# Patient Record
Sex: FEMALE | Race: WHITE | NOT HISPANIC OR LATINO | Employment: PART TIME | URBAN - METROPOLITAN AREA
[De-identification: names, ages, dates, MRNs, and addresses within clinical notes are randomized per-mention and may not be internally consistent; named-entity substitution may affect disease eponyms.]

---

## 2018-08-20 ENCOUNTER — HOSPITAL ENCOUNTER (EMERGENCY)
Facility: HOSPITAL | Age: 43
Discharge: HOME | End: 2018-08-20
Attending: EMERGENCY MEDICINE
Payer: MEDICAID

## 2018-08-20 VITALS
OXYGEN SATURATION: 100 % | WEIGHT: 124.88 LBS | DIASTOLIC BLOOD PRESSURE: 75 MMHG | HEIGHT: 63 IN | RESPIRATION RATE: 20 BRPM | BODY MASS INDEX: 22.12 KG/M2 | SYSTOLIC BLOOD PRESSURE: 156 MMHG | TEMPERATURE: 98 F | HEART RATE: 84 BPM

## 2018-08-20 DIAGNOSIS — R03.0 ELEVATED BLOOD PRESSURE READING WITHOUT DIAGNOSIS OF HYPERTENSION: ICD-10-CM

## 2018-08-20 DIAGNOSIS — K04.7 DENTAL ABSCESS: Primary | ICD-10-CM

## 2018-08-20 PROCEDURE — 99283 EMERGENCY DEPT VISIT LOW MDM: CPT

## 2018-08-20 RX ORDER — CLINDAMYCIN HYDROCHLORIDE 150 MG/1
300 CAPSULE ORAL 4 TIMES DAILY
Qty: 56 CAPSULE | Refills: 0 | Status: SHIPPED | OUTPATIENT
Start: 2018-08-20 | End: 2018-08-20 | Stop reason: SDUPTHER

## 2018-08-20 RX ORDER — CLINDAMYCIN HYDROCHLORIDE 150 MG/1
300 CAPSULE ORAL 4 TIMES DAILY
Qty: 56 CAPSULE | Refills: 0 | Status: SHIPPED | OUTPATIENT
Start: 2018-08-20 | End: 2018-08-27

## 2018-08-20 RX ORDER — BUPRENORPHINE HYDROCHLORIDE AND NALOXONE HYDROCHLORIDE DIHYDRATE 8; 2 MG/1; MG/1
1.5 TABLET SUBLINGUAL DAILY
COMMUNITY

## 2018-08-20 RX ORDER — NAPROXEN 500 MG/1
500 TABLET ORAL 2 TIMES DAILY WITH MEALS
Qty: 20 TABLET | Refills: 0 | Status: SHIPPED | OUTPATIENT
Start: 2018-08-20 | End: 2018-10-30

## 2018-08-20 RX ORDER — NAPROXEN 500 MG/1
500 TABLET ORAL 2 TIMES DAILY WITH MEALS
Qty: 20 TABLET | Refills: 0 | Status: SHIPPED | OUTPATIENT
Start: 2018-08-20 | End: 2018-08-20 | Stop reason: SDUPTHER

## 2018-08-20 NOTE — ED PROVIDER NOTES
Encounter Date: 8/20/2018       History     Chief Complaint   Patient presents with    Headache     Pt reports headache and earache since Saturday. BC powder 1-2 hour pta.     Otalgia     R ear.      The history is provided by the patient.   Headache    This is a new problem. The current episode started in the past 7 days. The problem occurs daily. The problem has been unchanged. The pain is located in the right unilateral and temporal region. The pain does not radiate. The pain quality is similar to prior headaches. The quality of the pain is described as aching. The pain is at a severity of 6/10. Associated symptoms include ear pain. Pertinent negatives include no abdominal pain, anorexia, back pain, blurred vision, coughing, dizziness, drainage, eye pain, eye redness, eye watering, fever, hearing loss, insomnia, loss of balance, muscle aches, nausea, neck pain, numbness, photophobia, seizures, sinus pressure, sore throat, swollen glands, tingling, tinnitus, visual change, vomiting, weakness or weight loss. The symptoms are aggravated by unknown. She has tried nothing for the symptoms.   Otalgia   This is a new problem. The current episode started several days ago. The problem occurs daily. The problem has been unchanged. The pain is at a severity of 7/10. Associated symptoms include headaches. Pertinent negatives include no hearing loss, no sore throat, no abdominal pain, no vomiting, no neck pain and no cough.        Review of patient's allergies indicates:  No Known Allergies  History reviewed. No pertinent past medical history.  Past Surgical History:   Procedure Laterality Date    BREAST SURGERY      TUBAL LIGATION       History reviewed. No pertinent family history.  Social History     Tobacco Use    Smoking status: Current Every Day Smoker     Packs/day: 1.00     Types: Cigarettes    Smokeless tobacco: Never Used   Substance Use Topics    Alcohol use: Yes     Comment: occasionally     Drug use: Yes      Review of Systems   Constitutional: Negative for fever and weight loss.   HENT: Positive for dental problem and ear pain. Negative for hearing loss, sinus pressure, sore throat and tinnitus.    Eyes: Negative for blurred vision, photophobia, pain and redness.   Respiratory: Negative for cough.    Gastrointestinal: Negative for abdominal pain, anorexia, nausea and vomiting.   Musculoskeletal: Negative for back pain and neck pain.   Neurological: Positive for headaches. Negative for dizziness, tingling, seizures, weakness, numbness and loss of balance.   Psychiatric/Behavioral: The patient does not have insomnia.    All other systems reviewed and are negative.      Physical Exam     Initial Vitals [08/20/18 1548]   BP Pulse Resp Temp SpO2   (!) 167/82 84 20 98 °F (36.7 °C) 100 %      MAP       --         Physical Exam    Nursing note and vitals reviewed.  Constitutional: She appears well-developed and well-nourished. No distress.   HENT:   Head: Normocephalic and atraumatic.   Right Ear: Hearing, tympanic membrane, external ear and ear canal normal. Tympanic membrane is not erythematous.   Left Ear: Hearing, tympanic membrane, external ear and ear canal normal. Tympanic membrane is not erythematous.   Nose: Nose normal.   Mouth/Throat: Oropharynx is clear and moist and mucous membranes are normal. Abnormal dentition. Dental abscesses and dental caries present.       Eyes: Conjunctivae and EOM are normal. Pupils are equal, round, and reactive to light.   Neck: Normal range of motion. Neck supple.   Cardiovascular: Normal rate and regular rhythm.   Pulmonary/Chest: Breath sounds normal. No respiratory distress. She has no wheezes. She has no rales.   Abdominal: Soft. Bowel sounds are normal.   Musculoskeletal: Normal range of motion.   Neurological: She is alert and oriented to person, place, and time. She has normal strength.   Skin: Skin is warm and dry.   Psychiatric: She has a normal mood and affect. Thought  content normal.         ED Course   Procedures  Labs Reviewed - No data to display       Imaging Results    None     4:18 PM - Counseling: Spoke with the patient and discussed todays findings, in addition to providing specific details for the plan of care and counseling regarding the diagnosis and prognosis. Questions are answered at this time. Patient's headache is either consistent with previous headache and/or lacks features concerning for emergent or life threatening condition.  I do not suspect SAH, meningitis, increased IC pressure, infectious, toxic, vascular, CNS, or other EMC.  I have discussed this at length with patient and/or family/caretaker.      Pre-hypertension/Hypertension: The pt has been informed that they may have pre-hypertension or hypertension based on a blood pressure reading in the ED. I recommend that the pt call the PCP listed on their discharge instructions or a physician of their choice this week to arrange f/u for further evaluation of possible pre-hypertension or hypertension.                            Clinical Impression:   The primary encounter diagnosis was Dental abscess. A diagnosis of Elevated blood pressure reading without diagnosis of hypertension was also pertinent to this visit.      Disposition:   Disposition: Discharged  Condition: Stable                        Davon Cabral MD  08/20/18 0415

## 2018-10-30 ENCOUNTER — HOSPITAL ENCOUNTER (EMERGENCY)
Facility: HOSPITAL | Age: 43
Discharge: HOME | End: 2018-10-30
Attending: EMERGENCY MEDICINE
Payer: MEDICAID

## 2018-10-30 VITALS
DIASTOLIC BLOOD PRESSURE: 70 MMHG | RESPIRATION RATE: 18 BRPM | HEIGHT: 63 IN | SYSTOLIC BLOOD PRESSURE: 133 MMHG | WEIGHT: 124.31 LBS | TEMPERATURE: 98 F | OXYGEN SATURATION: 100 % | HEART RATE: 62 BPM | BODY MASS INDEX: 22.03 KG/M2

## 2018-10-30 DIAGNOSIS — N92.0 MENORRHAGIA WITH REGULAR CYCLE: ICD-10-CM

## 2018-10-30 DIAGNOSIS — D64.9 ANEMIA: ICD-10-CM

## 2018-10-30 DIAGNOSIS — D64.9 ANEMIA, UNSPECIFIED TYPE: Primary | ICD-10-CM

## 2018-10-30 DIAGNOSIS — Z72.0 TOBACCO ABUSE: ICD-10-CM

## 2018-10-30 DIAGNOSIS — R03.0 ELEVATED BLOOD PRESSURE READING: ICD-10-CM

## 2018-10-30 LAB
ANISOCYTOSIS BLD QL SMEAR: ABNORMAL
B-HCG UR QL: NEGATIVE
BASOPHILS # BLD AUTO: 0.03 K/UL
BASOPHILS NFR BLD: 0.4 %
BILIRUB UR QL STRIP: NEGATIVE
CLARITY UR REFRACT.AUTO: CLEAR
COLOR UR AUTO: YELLOW
DACRYOCYTES BLD QL SMEAR: ABNORMAL
DIFFERENTIAL METHOD: ABNORMAL
EOSINOPHIL # BLD AUTO: 0 K/UL
EOSINOPHIL NFR BLD: 0 %
ERYTHROCYTE [DISTWIDTH] IN BLOOD BY AUTOMATED COUNT: 20.5 %
FOLATE SERPL-MCNC: 12.5 NG/ML
GLUCOSE UR QL STRIP: NEGATIVE
HCT VFR BLD AUTO: 24.9 %
HGB BLD-MCNC: 6.8 G/DL
HGB UR QL STRIP: NEGATIVE
HYPOCHROMIA BLD QL SMEAR: ABNORMAL
IRON SERPL-MCNC: 11 UG/DL
KETONES UR QL STRIP: NEGATIVE
LEUKOCYTE ESTERASE UR QL STRIP: NEGATIVE
LYMPHOCYTES # BLD AUTO: 1.7 K/UL
LYMPHOCYTES NFR BLD: 20.1 %
MCH RBC QN AUTO: 16.4 PG
MCHC RBC AUTO-ENTMCNC: 27.3 G/DL
MCV RBC AUTO: 60 FL
MONOCYTES # BLD AUTO: 0.5 K/UL
MONOCYTES NFR BLD: 6.2 %
NEUTROPHILS # BLD AUTO: 6.2 K/UL
NEUTROPHILS NFR BLD: 73.3 %
NITRITE UR QL STRIP: NEGATIVE
OVALOCYTES BLD QL SMEAR: ABNORMAL
PH UR STRIP: 6 [PH] (ref 5–8)
PLATELET # BLD AUTO: 323 K/UL
PMV BLD AUTO: 10.2 FL
POIKILOCYTOSIS BLD QL SMEAR: ABNORMAL
POLYCHROMASIA BLD QL SMEAR: ABNORMAL
PROT UR QL STRIP: NEGATIVE
RBC # BLD AUTO: 4.14 M/UL
RETICS/RBC NFR AUTO: 0.9 %
SATURATED IRON: 3 %
SCHISTOCYTES BLD QL SMEAR: PRESENT
SP GR UR STRIP: <=1.005 (ref 1–1.03)
TOTAL IRON BINDING CAPACITY: 416 UG/DL
TRANSFERRIN SERPL-MCNC: 281 MG/DL
TROPONIN I SERPL DL<=0.01 NG/ML-MCNC: <0.006 NG/ML
URN SPEC COLLECT METH UR: ABNORMAL
UROBILINOGEN UR STRIP-ACNC: NEGATIVE EU/DL
VIT B12 SERPL-MCNC: 510 PG/ML
WBC # BLD AUTO: 8.51 K/UL

## 2018-10-30 PROCEDURE — 99285 EMERGENCY DEPT VISIT HI MDM: CPT | Mod: 25

## 2018-10-30 PROCEDURE — 82746 ASSAY OF FOLIC ACID SERUM: CPT

## 2018-10-30 PROCEDURE — 93010 ELECTROCARDIOGRAM REPORT: CPT | Mod: ,,, | Performed by: INTERNAL MEDICINE

## 2018-10-30 PROCEDURE — 85045 AUTOMATED RETICULOCYTE COUNT: CPT

## 2018-10-30 PROCEDURE — 82607 VITAMIN B-12: CPT

## 2018-10-30 PROCEDURE — 99900035 HC TECH TIME PER 15 MIN (STAT)

## 2018-10-30 PROCEDURE — 84484 ASSAY OF TROPONIN QUANT: CPT

## 2018-10-30 PROCEDURE — 81003 URINALYSIS AUTO W/O SCOPE: CPT

## 2018-10-30 PROCEDURE — 81025 URINE PREGNANCY TEST: CPT

## 2018-10-30 PROCEDURE — 83540 ASSAY OF IRON: CPT

## 2018-10-30 PROCEDURE — 85025 COMPLETE CBC W/AUTO DIFF WBC: CPT

## 2018-10-30 PROCEDURE — 93005 ELECTROCARDIOGRAM TRACING: CPT

## 2018-10-30 PROCEDURE — 36000 PLACE NEEDLE IN VEIN: CPT

## 2018-10-30 NOTE — DISCHARGE INSTRUCTIONS
Your blood pressure was elevated in the ED.  You may have high blood pressure.   Keep a log of your blood pressure and follow up with a Primary Care Provider within the next two weeks. Call [phone] for appointment.

## 2018-10-31 ENCOUNTER — HOSPITAL ENCOUNTER (EMERGENCY)
Facility: HOSPITAL | Age: 43
Discharge: HOME | End: 2018-10-31
Attending: EMERGENCY MEDICINE
Payer: MEDICAID

## 2018-10-31 VITALS
RESPIRATION RATE: 14 BRPM | DIASTOLIC BLOOD PRESSURE: 88 MMHG | SYSTOLIC BLOOD PRESSURE: 177 MMHG | BODY MASS INDEX: 22.26 KG/M2 | HEART RATE: 62 BPM | TEMPERATURE: 98 F | HEIGHT: 63 IN | WEIGHT: 125.63 LBS | OXYGEN SATURATION: 100 %

## 2018-10-31 DIAGNOSIS — D50.9 IRON DEFICIENCY ANEMIA, UNSPECIFIED IRON DEFICIENCY ANEMIA TYPE: Primary | ICD-10-CM

## 2018-10-31 DIAGNOSIS — R53.1 WEAKNESS: ICD-10-CM

## 2018-10-31 LAB
ABO + RH BLD: NORMAL
ANISOCYTOSIS BLD QL SMEAR: SLIGHT
BASOPHILS # BLD AUTO: 0.04 K/UL
BASOPHILS NFR BLD: 0.4 %
BLD GP AB SCN CELLS X3 SERPL QL: NORMAL
BLD PROD TYP BPU: NORMAL
BLD PROD TYP BPU: NORMAL
BLOOD UNIT EXPIRATION DATE: NORMAL
BLOOD UNIT EXPIRATION DATE: NORMAL
BLOOD UNIT TYPE CODE: 7300
BLOOD UNIT TYPE CODE: 7300
BLOOD UNIT TYPE: NORMAL
BLOOD UNIT TYPE: NORMAL
CODING SYSTEM: NORMAL
CODING SYSTEM: NORMAL
DIFFERENTIAL METHOD: ABNORMAL
DISPENSE STATUS: NORMAL
DISPENSE STATUS: NORMAL
EOSINOPHIL # BLD AUTO: 0.1 K/UL
EOSINOPHIL NFR BLD: 0.7 %
ERYTHROCYTE [DISTWIDTH] IN BLOOD BY AUTOMATED COUNT: 20 %
HCT VFR BLD AUTO: 24.9 %
HGB BLD-MCNC: 6.7 G/DL
LYMPHOCYTES # BLD AUTO: 1.8 K/UL
LYMPHOCYTES NFR BLD: 15.8 %
MCH RBC QN AUTO: 16.6 PG
MCHC RBC AUTO-ENTMCNC: 26.9 G/DL
MCV RBC AUTO: 62 FL
MONOCYTES # BLD AUTO: 0.6 K/UL
MONOCYTES NFR BLD: 5.4 %
NEUTROPHILS # BLD AUTO: 8.8 K/UL
NEUTROPHILS NFR BLD: 77.7 %
NUM UNITS TRANS PACKED RBC: NORMAL
NUM UNITS TRANS PACKED RBC: NORMAL
OB PNL STL: NEGATIVE
OVALOCYTES BLD QL SMEAR: ABNORMAL
PLATELET # BLD AUTO: 313 K/UL
PMV BLD AUTO: ABNORMAL FL
POIKILOCYTOSIS BLD QL SMEAR: SLIGHT
RBC # BLD AUTO: 4.04 M/UL
STOMATOCYTES BLD QL SMEAR: PRESENT
TARGETS BLD QL SMEAR: ABNORMAL
WBC # BLD AUTO: 11.34 K/UL

## 2018-10-31 PROCEDURE — 86920 COMPATIBILITY TEST SPIN: CPT

## 2018-10-31 PROCEDURE — 36430 TRANSFUSION BLD/BLD COMPNT: CPT

## 2018-10-31 PROCEDURE — 85025 COMPLETE CBC W/AUTO DIFF WBC: CPT

## 2018-10-31 PROCEDURE — P9016 RBC LEUKOCYTES REDUCED: HCPCS

## 2018-10-31 PROCEDURE — 99283 EMERGENCY DEPT VISIT LOW MDM: CPT | Mod: 25

## 2018-10-31 PROCEDURE — 86901 BLOOD TYPING SEROLOGIC RH(D): CPT

## 2018-10-31 PROCEDURE — 82272 OCCULT BLD FECES 1-3 TESTS: CPT

## 2018-10-31 RX ORDER — QUINIDINE GLUCONATE 324 MG
480 TABLET, EXTENDED RELEASE ORAL 3 TIMES DAILY
Qty: 180 TABLET | Refills: 0 | Status: SHIPPED | OUTPATIENT
Start: 2018-10-31 | End: 2018-11-30

## 2018-10-31 RX ORDER — HYDROCODONE BITARTRATE AND ACETAMINOPHEN 500; 5 MG/1; MG/1
TABLET ORAL
Status: DISCONTINUED | OUTPATIENT
Start: 2018-10-31 | End: 2018-10-31 | Stop reason: HOSPADM

## 2018-10-31 NOTE — ED NOTES
Pt sitting in bed, awake and alert. NAD,VSS, RR are equal and unlabored. Will continue to monitor.

## 2018-10-31 NOTE — ED NOTES
Pt c/o fatigue and lower back with accompanied by shortness of breath with exertion over the past couple of months.     Patient moved to ED room 1, patient assisted onto stretcher and changed into a gown. Patient placed on cardiac monitor, continuous pulse oximetry and automatic blood pressure cuff. Bed placed in low locked position, side rails up x 2, call light is within reach of patient or family, orientation to room and explanation of wait provided to family and patient, alarms set and turned on for monitor and pulse ox, awaiting MD evaluation and orders, will continue to monitor.    Patient identifies self as Kisha Minor      LOC: The patient is awake, alert and aware of environment with an appropriate affect, the patient is oriented x 3 and speaking appropriately.  APPEARANCE: Patient resting comfortably and in no acute distress, patient is clean and well groomed, patient's clothing is properly fastened.  SKIN: The skin is warm and dry, color consistent with ethnicity, patient has normal skin turgor and moist mucus membranes, skin intact, no breakdown or bruising noted.  MUSCULOSKELETAL: Patient moving all extremities well, no obvious swelling or deformities noted.   RESPIRATORY: Airway is open and patent, respirations are spontaneous, patient has a normal effort and rate, no accessory muscle use noted.  CARDIAC: Patient has a normal rate and rhythm, no periphreal edema noted, capillary refill < 3 seconds.  ABDOMEN: Soft and non tender to palpation, no distention noted.  NEUROLOGIC: PERRL, 3 mm bilaterally, eyes open spontaneously, behavior appropriate to situation, follows commands, facial expression symmetrical, bilateral hand grasp equal and even, purposeful motor response noted, normal sensation in all extremities when touched with a finger.

## 2018-10-31 NOTE — DISCHARGE INSTRUCTIONS
For gone as prescribed.  Follow up with the primary care doctor in 1-2 days for re-evaluation.  Return as needed for any worsening symptoms, problems, questions or concerns.

## 2018-10-31 NOTE — ED PROVIDER NOTES
SCRIBE #1 NOTE: I, Yaneth Easley, am scribing for, and in the presence of, Elian Chun Jr., MD. I have scribed the entire note.      History      Chief Complaint   Patient presents with    Anemia     sent from [facility] for transfusion. Reports being symptomatic-fatigue       Review of patient's allergies indicates:   Allergen Reactions    Pcn [penicillins] Hives and Itching        HPI   HPI    10/31/2018, 9:27 AM   History obtained from the patient      History of Present Illness: Kisha Minor is a 43 y.o. female patient who presents to the Emergency Department for a blood transfusion. PCP called and referred pt to the ED for anemia yesterday. Pt was evaluated and had labs performed at [facility] yesterday, but was not able to receive a blood transfusion. Pt is c/o fatigue. Symptoms are constant and moderate in severity. No mitigating or exacerbating factors reported. Associated sxs include SOB on exertion and heavy menstrual bleeding. Patient denies any CP, diaphoresis, palpitations, extremity weakness/numbness, leg pain/swelling, dizziness, cough, n/v/d, abd pain, blood in stool, dysuria, hematuria, fever, chills, syncope, and all other sxs at this time. No further complaints or concerns at this time.         Arrival mode: Personal vehicle      PCP: COLLIN Billingsley       Past Medical History:  History reviewed. No pertinent past medical history.    Past Surgical History:  Past Surgical History:   Procedure Laterality Date    BREAST SURGERY      TUBAL LIGATION           Family History:  History reviewed. No pertinent family history.    Social History:  Social History     Tobacco Use    Smoking status: Current Every Day Smoker     Packs/day: 1.00     Types: Cigarettes    Smokeless tobacco: Never Used   Substance and Sexual Activity    Alcohol use: Yes     Comment: occasionally     Drug use: Yes    Sexual activity: Unknown       ROS   Review of Systems   Constitutional: Positive for fatigue.  Negative for chills, diaphoresis and fever.   HENT: Negative for sore throat.    Respiratory: Positive for shortness of breath. Negative for cough.    Cardiovascular: Negative for chest pain, palpitations and leg swelling.   Gastrointestinal: Negative for abdominal pain, blood in stool, diarrhea, nausea and vomiting.   Genitourinary: Positive for menstrual problem (heavy bleeding). Negative for dysuria and hematuria.   Musculoskeletal: Negative for back pain and myalgias.   Skin: Negative for rash.   Neurological: Negative for dizziness, weakness and numbness.   Hematological: Does not bruise/bleed easily.   All other systems reviewed and are negative.      Physical Exam      Initial Vitals [10/31/18 0926]   BP Pulse Resp Temp SpO2   128/74 95 18 98.3 °F (36.8 °C) 100 %      MAP       --          Physical Exam  Nursing Notes and Vital Signs Reviewed.  Constitutional: Patient is in no acute distress. Well-developed and well-nourished.  Head: Atraumatic. Normocephalic.  Eyes: PERRL. EOM intact. Conjunctivae are not pale. No scleral icterus.  ENT: Mucous membranes are moist. Oropharynx is clear and symmetric.    Neck: Supple. Full ROM. No lymphadenopathy.  Cardiovascular: Regular rate. Regular rhythm. No murmurs, rubs, or gallops. Distal pulses are 2+ and symmetric.  Pulmonary/Chest: No respiratory distress. Clear to auscultation bilaterally. No wheezing or rales.  Abdominal: Soft and non-distended.  There is no tenderness.  No rebound, guarding, or rigidity.   Musculoskeletal: Moves all extremities. No obvious deformities. No edema.   Rectal:  No tenderness.  No masses.  No hemorrhoids.  Normal sphincter tone.   Skin: Warm and dry.  Neurological:  Alert, awake, and appropriate.  Normal speech.  No acute focal neurological deficits are appreciated.  Psychiatric: Normal affect. Good eye contact. Appropriate in content.    ED Course    Procedures  ED Vital Signs:  Vitals:    10/31/18 0926 10/31/18 1243 10/31/18 1303  "  BP: 128/74 128/76 132/78   Pulse: 95 64 62   Resp: 18 20    Temp: 98.3 °F (36.8 °C) 99 °F (37.2 °C) 99.5 °F (37.5 °C)   TempSrc: Oral Oral Oral   SpO2: 100% 99% 97%   Weight: 57 kg (125 lb 9.6 oz)     Height: 5' 3" (1.6 m)         Abnormal Lab Results:  Labs Reviewed   CBC W/ AUTO DIFFERENTIAL - Abnormal; Notable for the following components:       Result Value    Hemoglobin 6.7 (*)     Hematocrit 24.9 (*)     MCV 62 (*)     MCH 16.6 (*)     MCHC 26.9 (*)     RDW 20.0 (*)     Gran # (ANC) 8.8 (*)     Gran% 77.7 (*)     Lymph% 15.8 (*)     All other components within normal limits   OCCULT BLOOD X 1, STOOL   TYPE & SCREEN   PREPARE RBC SOFT        All Lab Results:  Results for orders placed or performed during the hospital encounter of 10/31/18   CBC auto differential   Result Value Ref Range    WBC 11.34 3.90 - 12.70 K/uL    RBC 4.04 4.00 - 5.40 M/uL    Hemoglobin 6.7 (L) 12.0 - 16.0 g/dL    Hematocrit 24.9 (L) 37.0 - 48.5 %    MCV 62 (L) 82 - 98 fL    MCH 16.6 (L) 27.0 - 31.0 pg    MCHC 26.9 (L) 32.0 - 36.0 g/dL    RDW 20.0 (H) 11.5 - 14.5 %    Platelets 313 150 - 350 K/uL    MPV SEE COMMENT 9.2 - 12.9 fL    Gran # (ANC) 8.8 (H) 1.8 - 7.7 K/uL    Lymph # 1.8 1.0 - 4.8 K/uL    Mono # 0.6 0.3 - 1.0 K/uL    Eos # 0.1 0.0 - 0.5 K/uL    Baso # 0.04 0.00 - 0.20 K/uL    Gran% 77.7 (H) 38.0 - 73.0 %    Lymph% 15.8 (L) 18.0 - 48.0 %    Mono% 5.4 4.0 - 15.0 %    Eosinophil% 0.7 0.0 - 8.0 %    Basophil% 0.4 0.0 - 1.9 %    Aniso Slight     Poik Slight     Ovalocytes Occasional     Target Cells Occasional     Stomatocytes Present     Differential Method Automated    Occult blood x 1, stool   Result Value Ref Range    Occult Blood Negative Negative   Type & Screen   Result Value Ref Range    Group & Rh B POS     Indirect Alan NEG    Prepare RBC 2 Units; Anemia that is symptomatic   Result Value Ref Range    UNIT NUMBER [id number]     PRODUCT CODE K6959F46     DISPENSE STATUS CROSSMATCHED     CODING SYSTEM [id number]     Unit " Blood Type Code 7300     Unit Blood Type B POS     Unit Expiration [id number]     UNIT NUMBER [id number]     PRODUCT CODE V0260P18     DISPENSE STATUS ISSUED     CODING SYSTEM [id number]     Unit Blood Type Code 7300     Unit Blood Type B POS     Unit Expiration [id number]        All Lab Results:        Results for orders placed or performed during the hospital encounter of 10/30/18   CBC auto differential   Result Value Ref Range     WBC 8.51 3.90 - 12.70 K/uL     RBC 4.14 4.00 - 5.40 M/uL     Hemoglobin 6.8 (L) 12.0 - 16.0 g/dL     Hematocrit 24.9 (L) 37.0 - 48.5 %     MCV 60 (L) 82 - 98 fL     MCH 16.4 (L) 27.0 - 31.0 pg     MCHC 27.3 (L) 32.0 - 36.0 g/dL     RDW 20.5 (H) 11.5 - 14.5 %     Platelets 323 150 - 350 K/uL     MPV 10.2 9.2 - 12.9 fL     Gran # (ANC) 6.2 1.8 - 7.7 K/uL     Lymph # 1.7 1.0 - 4.8 K/uL     Mono # 0.5 0.3 - 1.0 K/uL     Eos # 0.0 0.0 - 0.5 K/uL     Baso # 0.03 0.00 - 0.20 K/uL     Gran% 73.3 (H) 38.0 - 73.0 %     Lymph% 20.1 18.0 - 48.0 %     Mono% 6.2 4.0 - 15.0 %     Eosinophil% 0.0 0.0 - 8.0 %     Basophil% 0.4 0.0 - 1.9 %     Aniso Moderate       Poik Moderate       Poly Occasional       Hypo Moderate       Ovalocytes Occasional       Tear Drop Cells Occasional       Schistocytes Present       Differential Method Automated     Reticulocytes   Result Value Ref Range     Retic 0.9 0.5 - 2.5 %   Urinalysis   Result Value Ref Range     Specimen UA Urine, Clean Catch       Color, UA Yellow Yellow, Straw, Clari     Appearance, UA Clear Clear     pH, UA 6.0 5.0 - 8.0     Specific Gravity, UA <=1.005 (A) 1.005 - 1.030     Protein, UA Negative Negative     Glucose, UA Negative Negative     Ketones, UA Negative Negative     Bilirubin (UA) Negative Negative     Occult Blood UA Negative Negative     Nitrite, UA Negative Negative     Urobilinogen, UA Negative <2.0 EU/dL     Leukocytes, UA Negative Negative   Pregnancy, urine rapid   Result Value Ref Range     Preg Test, Ur Negative     Troponin  I   Result Value Ref Range     Troponin I <0.006 0.000 - 0.026 ng/mL         Imaging Results:  Imaging Results    None                 The Emergency Provider reviewed the vital signs and test results, which are outlined above.    ED Discussion     1:17 PM: Reassessed pt at this time.  Pt is awake, alert, and in NAD at this time. Discussed with pt all pertinent ED information and results. Discussed pt dx and plan of tx. Gave pt all f/u and return to the ED instructions. All questions and concerns were addressed at this time. Pt expresses understanding of information and instructions, and is comfortable with plan to discharge. Pt is stable for discharge.    I discussed with patient and/or family/caretaker that evaluation in the ED does not suggest any emergent or life threatening medical conditions requiring immediate intervention beyond what was provided in the ED, and I believe patient is safe for discharge.  Regardless, an unremarkable evaluation in the ED does not preclude the development or presence of a serious of life threatening condition. As such, patient was instructed to return immediately for any worsening or change in current symptoms.    Current Discharge Medication List      START taking these medications    Details   ferrous gluconate (FERGON) 240 (27 FE) MG tablet Take 2 tablets (480 mg total) by mouth 3 (three) times daily.  Qty: 180 tablet, Refills: 0         CONTINUE these medications which have NOT CHANGED    Details   buprenorphine-naloxone 8-2 mg (SUBOXONE) 8-2 mg Subl Place 1.5 tablets under the tongue Daily.                 ED Medication(s):  Medications   0.9%  NaCl infusion (for blood administration) (not administered)       Follow-up Information     Schedule an appointment as soon as possible for a visit  with COLLIN Billingsley.    Specialty:  Family Medicine  Contact information:  [address] [place] [address] [phone]                     Medical Decision Making    Medical  Decision Making:   Clinical Tests:   Lab Tests: Ordered and Reviewed           Scribe Attestation:   Scribe #1: I performed the above scribed service and the documentation accurately describes the services I performed. I attest to the accuracy of the note.    Attending:   Physician Attestation Statement for Scribe #1: I, Elian Chun Jr., MD, personally performed the services described in this documentation, as scribed by Yaneth Easley, in my presence, and it is both accurate and complete.          Clinical Impression       ICD-10-CM ICD-9-CM   1. Iron deficiency anemia, unspecified iron deficiency anemia type D50.9 280.9   2. Weakness R53.1 780.79       Disposition:   Disposition: Discharged  Condition: Stable         Elian Chun Jr., MD  10/31/18 1360

## 2018-11-01 NOTE — ED NOTES
Discharge instructions explained to patient. Patient verbalizes understanding. Patient and discharge paperwork escorted to registration desk by RN at this time. Discharge paperwork given to registration for completion of discharge

## 2021-01-19 PROBLEM — M34.9 LIMITED SCLERODERMA: Status: ACTIVE | Noted: 2021-01-19

## 2021-01-19 PROBLEM — Z72.0 TOBACCO USE: Status: ACTIVE | Noted: 2021-01-19

## 2021-05-12 ENCOUNTER — PATIENT MESSAGE (OUTPATIENT)
Dept: RESEARCH | Facility: HOSPITAL | Age: 46
End: 2021-05-12

## 2022-09-22 ENCOUNTER — SPECIALTY PHARMACY (OUTPATIENT)
Dept: PHARMACY | Facility: CLINIC | Age: 47
End: 2022-09-22

## 2022-09-22 NOTE — TELEPHONE ENCOUNTER
Sasha, this is Izabela Parker with [facility].  We are working on your prescription that your doctor has sent us. We will be working with your insurance to get this approved for you. We will be calling you along the way with updates on your medication.  If you have any questions, you can reach us at [phone].  Welcome call outcome: Left voicemail.     Actemra rx received   -PA is required. Will submit PA once the chart notes are signed.

## 2022-09-27 NOTE — TELEPHONE ENCOUNTER
Outgoing call to patient. Informed signed AOR form is necessary. She voiced understanding. AOR form sent to patient via email in chart. Will begin compiling appeal materials while awaiting signed AOR from patient.

## 2022-09-28 NOTE — TELEPHONE ENCOUNTER
[facility]. Marked as urgent, so should the plan agree that the appeal is urgent anticipated decision date is in 3-4 business days.

## 2022-09-30 NOTE — TELEPHONE ENCOUNTER
Voicemail received by [facility] on 9/30-   Richa(Appeals Review Rep) from [facility].   They are extending the deadline for the appeal & should have a decision on Monday or Tuesday.

## 2022-10-04 NOTE — TELEPHONE ENCOUNTER
Outgoing call to [facility] to check status of appeal.   Rep: Richa Lawrencejaycee was upheld with rationale being- a rheumatologist did a peer/match review. Not upheld because not an FDA approved diagnosis.   [place] hearing is the only next step. No other further appeal options are available.     Dr. Girard informed. Will forward to  for PAP.      10:02 AM patient informed via phone call.

## 2022-10-06 NOTE — TELEPHONE ENCOUNTER
Outgoing call to patient regarding Actemra prescription we received. Informed patient Actemra has been denied through insurance. Am able to apply patient for  assistance. Patient provided consent, HH size, and annual income and requested to have pt portion sent via e-mail on file. Sent a staff message to [employer] regarding Actemra assistance application and faxed application prescription to [phone] for provider review and signature.  Will continue to follow up.

## 2022-10-17 NOTE — TELEPHONE ENCOUNTER
Provider portion received. Submitted complete application via fax @ [phone]. Will continue to follow up until final determination is made.

## 2022-10-24 NOTE — TELEPHONE ENCOUNTER
Outgoing call to [employer] to check the status of Actemra PAP application. Rep Martinez stated the application is still pending review for a final determination.

## 2022-10-24 NOTE — TELEPHONE ENCOUNTER
Patient is approved for [employer] Patient Assistance Program as of 10/24/22 and will receive Actemra  free of charge through the programs dispensing pharmacy, [facility]. Someone from the program's pharmacy will be reaching out to patient to coordinate their first shipment. Patient can also contact [facility] at [phone] to check on the status of their prescription and schedule shipment for medication. Approval letter scanned into media.         FOR DOCUMENTATION ONLY:  Financial Assistance for Actemra is approved 10/24/2022   Source: [employer] Patient Assistance [employer]  Case ID: [id number]  Phone: [phone]  Fax: [phone]   Pharmacy: [facility]     Outgoing call to pt to inform of approval, pt verbalized understanding.

## 2022-10-25 NOTE — TELEPHONE ENCOUNTER
Closing out referral. Thank you for allowing us to participate in this patient's care.  [facility] will happily assist with future referrals for this patient.

## 2024-01-29 NOTE — ED PROVIDER NOTES
Problem: Pain  Goal: Verbalizes/displays adequate comfort level or baseline comfort level  Outcome: Progressing     Problem: Discharge Planning  Goal: Discharge to home or other facility with appropriate resources  Outcome: Progressing     Problem: Safety - Adult  Goal: Free from fall injury  Outcome: Progressing      "   History     Chief Complaint   Patient presents with    Anemia     Pt states, "My dr told me that I was chronic anemia, and needed blood."        Review of patient's allergies indicates:  No Known Allergies    History of Present Illness   HPI    10/30/2018, 10:30 AM  The history is provided by the patient    Kisha Minor is a 43 y.o. female presenting to the ED for blood transfusion.  Patient reports that she had blood work performed yesterday.  Her primary care physician called her and referred her to the emergency room to get a blood transfusion.  Patient reports for the last several months, she has been having numbness tingling to her hands, poor circulation in her fingers, fingers turning white.  She also notes that she has been short of breath when she ambulates.  She also develops chest pressure.  She denies any nausea, vomiting, or diaphoresis with the chest pressure.  Patient denies any known history of coronary artery disease.  The chest pressure feels like she ran a marathon.  The chest pressure goes away when she rests.  She states that she gets very short of breath when she walks from [employer] lot into the store.  She has to stop and rest.  Patient also gets back pain when she ambulates, better with rest.  She has been taking approximately 6 BC pills per day to help with the back discomfort.  Patient denies any melena or hematochezia.  She notes that she has been having heavy periods.  She reports that she will have clots upon clots.  She uses 1 full package of super plus.  Her last menstrual period was approximately 1 week ago.  She is not currently bleeding.      Arrival mode:  Personal Vehicle    PCP: COLLIN Billingsley     Allergies:  Review of patient's allergies indicates:  No Known Allergies    Past Medical History:  History reviewed. No pertinent past medical history.    Past Surgical History:  Past Surgical History:   Procedure Laterality Date    BREAST SURGERY      TUBAL " LIGATION           Family History:  History reviewed. No pertinent family history.    Social History:  Social History     Tobacco Use    Smoking status: Current Every Day Smoker     Packs/day: 1.00     Types: Cigarettes    Smokeless tobacco: Never Used   Substance and Sexual Activity    Alcohol use: Yes     Comment: occasionally     Drug use: Yes    Sexual activity: Not on file        Review of Systems   Review of Systems   Constitutional: Positive for fatigue. Negative for fever.   HENT: Negative for sore throat.    Respiratory: Positive for chest tightness. Negative for cough and shortness of breath.    Cardiovascular: Positive for leg swelling. Negative for chest pain.   Gastrointestinal: Negative for blood in stool, nausea and vomiting.   Genitourinary: Positive for menstrual problem and vaginal bleeding. Negative for dysuria, frequency and urgency.   Musculoskeletal: Negative for back pain.   Skin: Negative for rash.   Neurological: Positive for dizziness. Negative for weakness.   Hematological: Does not bruise/bleed easily.   Psychiatric/Behavioral: Negative for confusion.          Physical Exam     Initial Vitals [10/30/18 1018]   BP Pulse Resp Temp SpO2   (!) 146/68 69 16 98.1 °F (36.7 °C) 100 %      MAP       --          Physical Exam    Nursing Notes and Vital Signs Reviewed.  Constitutional: Patient is in no apparent distress. Well-developed and well-nourished.  Head: Atraumatic. Normocephalic.  Eyes: PERRL. EOM intact. Conjunctivae arepale. No scleral icterus.  ENT: Mucous membranes are moist. Oropharynx is clear and symmetric.    Neck: Supple. Full ROM. No lymphadenopathy.  Cardiovascular: Regular rate. Regular rhythm. No murmurs, rubs, or gallops. Distal pulses are 2+ and symmetric.  Pulmonary/Chest: No respiratory distress. Clear to auscultation bilaterally. No wheezing or rales.  Abdominal: Soft and non-distended.  There is no tenderness.  No rebound, guarding, or rigidity. Good bowel  "sounds.  Genitourinary: No CVA tenderness  Musculoskeletal: Moves all extremities. No obvious deformities. No edema. No calf tenderness.  Skin: Warm and dry.  Neurological:  Alert, awake, and appropriate.  Normal speech.  No acute focal neurological deficits are appreciated.  Psychiatric: Normal affect. Good eye contact. Appropriate in content.     ED Course   Procedures  ED Vital Signs:  Vitals:    10/30/18 1018 10/30/18 1102 10/30/18 1106 10/30/18 1107   BP: (!) 146/68 132/77 130/80 139/73   Pulse: 69 62 63 69   Resp: 16      Temp: 98.1 °F (36.7 °C)      TempSrc: Oral      SpO2: 100%      Weight: 56.4 kg (124 lb 5.4 oz)      Height: 5' 3" (1.6 m)       10/30/18 1130   BP: 134/75   Pulse: 68   Resp: 20   Temp:    TempSrc:    SpO2: 99%   Weight:    Height:        Abnormal Lab Results:  Labs Reviewed   CBC W/ AUTO DIFFERENTIAL - Abnormal; Notable for the following components:       Result Value    Hemoglobin 6.8 (*)     Hematocrit 24.9 (*)     MCV 60 (*)     MCH 16.4 (*)     MCHC 27.3 (*)     RDW 20.5 (*)     Gran% 73.3 (*)     All other components within normal limits   URINALYSIS - Abnormal; Notable for the following components:    Specific Gravity, UA <=1.005 (*)     All other components within normal limits   RETICULOCYTES   PREGNANCY TEST, URINE RAPID   TROPONIN I   TROPONIN I   FOLATE   VITAMIN B12   IRON AND TIBC        All Lab Results:  Results for orders placed or performed during the hospital encounter of 10/30/18   CBC auto differential   Result Value Ref Range    WBC 8.51 3.90 - 12.70 K/uL    RBC 4.14 4.00 - 5.40 M/uL    Hemoglobin 6.8 (L) 12.0 - 16.0 g/dL    Hematocrit 24.9 (L) 37.0 - 48.5 %    MCV 60 (L) 82 - 98 fL    MCH 16.4 (L) 27.0 - 31.0 pg    MCHC 27.3 (L) 32.0 - 36.0 g/dL    RDW 20.5 (H) 11.5 - 14.5 %    Platelets 323 150 - 350 K/uL    MPV 10.2 9.2 - 12.9 fL    Gran # (ANC) 6.2 1.8 - 7.7 K/uL    Lymph # 1.7 1.0 - 4.8 K/uL    Mono # 0.5 0.3 - 1.0 K/uL    Eos # 0.0 0.0 - 0.5 K/uL    Baso # 0.03 " 0.00 - 0.20 K/uL    Gran% 73.3 (H) 38.0 - 73.0 %    Lymph% 20.1 18.0 - 48.0 %    Mono% 6.2 4.0 - 15.0 %    Eosinophil% 0.0 0.0 - 8.0 %    Basophil% 0.4 0.0 - 1.9 %    Aniso Moderate     Poik Moderate     Poly Occasional     Hypo Moderate     Ovalocytes Occasional     Tear Drop Cells Occasional     Schistocytes Present     Differential Method Automated    Reticulocytes   Result Value Ref Range    Retic 0.9 0.5 - 2.5 %   Urinalysis   Result Value Ref Range    Specimen UA Urine, Clean Catch     Color, UA Yellow Yellow, Straw, Clari    Appearance, UA Clear Clear    pH, UA 6.0 5.0 - 8.0    Specific Gravity, UA <=1.005 (A) 1.005 - 1.030    Protein, UA Negative Negative    Glucose, UA Negative Negative    Ketones, UA Negative Negative    Bilirubin (UA) Negative Negative    Occult Blood UA Negative Negative    Nitrite, UA Negative Negative    Urobilinogen, UA Negative <2.0 EU/dL    Leukocytes, UA Negative Negative   Pregnancy, urine rapid   Result Value Ref Range    Preg Test, Ur Negative    Troponin I   Result Value Ref Range    Troponin I <0.006 0.000 - 0.026 ng/mL         The EKG was ordered, reviewed, and independently interpreted by the ED provider.       EKG Readings: (Independently Interpreted)   EKG:  Rate 65 beats per minute. Normal axis.  No acute ST or T-wave changes noted.        Imaging Results:  Imaging Results    None            The Emergency Provider reviewed the vital signs and test results, which are outlined above.     ED Discussion     11:00 AM The risks/ benefits of receiving a blood transfusion were discussed with the patient.  The risks include transmission of hepatitis(  B, C.), HIV, transfusion reactions,serum sickness, volume overload potentially necessitating ventilation support, or ALLERGIC reactions ( which can be potentially life-threatening).  Discussed that as eye reveals the stand alone emergency department, I do not have blood to administered patient.      11:58 AM  Reassessment:   Vicente reassessed the pt.  Patient states that she cannot go to the ER bed wrist location to receive blood.  She has to be home for her child.  She states that she will go tomorrow.  Of note, patient states that she only has chest discomfort when she exerts herself.  She feels comfortable at rest.  Patient verbalized understanding.  The pt is resting comfortably and is NAD.  Pt states their sx have improved. Discussed test results, shared treatment plan, specific conditions for return, and the need for f/u.  Answered their questions at this time.  Pt understands and agrees to the plan.  The pt has remained hemodynamically stable through ED course and is stable for discharge.      I discussed with patient and/or family/caretaker that evaluation in the ED does not suggest any emergent or life threatening medical conditions requiring immediate intervention beyond what was provided in the ED, and I believe patient is safe for discharge.  Regardless, an unremarkable evaluation in the ED does not preclude the development or presence of a serious of life threatening condition. As such, patient was instructed to return immediately for any worsening or change in current symptoms.      ED Medication(s):  Medications - No data to display       Medication List      ASK your doctor about these medications    buprenorphine-naloxone 8-2 mg 8-2 mg Subl  Commonly known as:  SUBOXONE            Follow-up Information     [facility].    Specialty:  Emergency Medicine  Why:  Return to emergency department for:  Chest pain, chest pressure, shortness of breath, passing out, or other concerns.  Contact information:  [address] [facility] [address] [phone]           John Anaya MD In 2 days.    Specialty:  Hematology and Oncology  Why:  For possible iron infusion  Contact information:  [address] [facility] [address] [phone]             [facility] In 2  days.    Specialty:  Obstetrics and Gynecology  Why:  For regular vaginal bleeding.  Contact information:  [address] [address] [address] [phone]                      MIPS Measures     Smoker? Yes     Hypertension: Pre-hypertension/Hypertension: The pt has been informed that they may have pre-hypertension or hypertension based on a blood pressure reading in the ED. I recommend that the pt call the PCP listed on their discharge instructions or a physician of their choice this week to arrange f/u for further evaluation of possible pre-hypertension or hypertension.          Medical Decision Making     Medical Decision Making:   Initial Assessment:   Patient is a 43-year-old presenting with palpitations, exertional dyspnea, exertional chest pain, lightheadedness and dizziness.  It is associated with heavy vaginal bleeding.  Differential Diagnosis:   Symptomatic anemia  Independently Interpreted Test(s):   I have ordered and independently interpreted EKG Reading(s) - see summary below  Clinical Tests:   Lab Tests: Ordered and Reviewed  ED Management:  Patient's blood work confirmed anemia.  It was recommend that she be transferred to our [place] location.  Patient stated that she could not be transferred at this time secondary to  needs.  As patient is not having any chest pain or chest pressure here in the emergency department, chest pain and chest pressure is only associated with moderate exertion, patient elects to go to our [place] location tomorrow.  Patient was given strict instructions to return to the emergency department for chest pain, chest pressure, lightheadedness, dizziness, worsening problems breathing.  Patient verbalized understanding.     Additional MDM:   Smoking Cessation: The patient is a smoker. The patient was counseled on smoking cessation for: 3 minutes. The patient was counseled on tobacco related  health complications. The patient was referred to a  tobacco treatment program.             Clinical Impression       ICD-10-CM ICD-9-CM   1. Anemia, unspecified type D64.9 285.9   2. Anemia D64.9 285.9   3. Menorrhagia with regular cycle N92.0 626.2   4. Tobacco abuse Z72.0 305.1   5. Elevated blood pressure reading R03.0 796.2       Disposition:   Disposition: Discharged  Condition: Stable                  Helga Zhang, DO  10/30/18 2183

## 2024-11-19 ENCOUNTER — PATIENT MESSAGE (OUTPATIENT)
Dept: GASTROENTEROLOGY | Facility: CLINIC | Age: 49
End: 2024-11-19
Payer: MEDICAID